# Patient Record
(demographics unavailable — no encounter records)

---

## 2025-02-22 NOTE — HISTORY OF PRESENT ILLNESS
[FreeTextEntry1] :    38yo presents for annual exam. LMP 25 weeks ago, regular cycles since her last child No complaints, declines vaginal pain, bleeding, discharge, odor, or dysuria. Pt has a hx of infertility and irregular periods for 7 years. After her last child 6.5 years ago she has had regular cycles. Pt is not currently on birth control and has not been since her last child. Reviewed bc options and pt wants to try depo-provera shot. Pt diagnosed with enlarged thyroid and is followed by primary care where she checks thyroid functions regularly.    ob hx: [id number]  x 3   largest 7lbs  hx molar pregnancy    gyn: hx of abnormal paps - colposcopy x2.  Pt states that her body "dispelled" 2 ParaGard IUDs. One fell out, one caused extreme pain and had to be removed within 3 days of placement.    pmh: enlarged thyroid / goiter,  migraines    psh: tummy tuck & inguinal hernia repair    meds: Imitrex   fam hx: no pertinent fmh    nka

## 2025-02-22 NOTE — END OF VISIT
[FreeTextEntry3] : as above  annual exam  pap gc chls ent  will rx for depoprovera, gives inj herself

## 2025-02-22 NOTE — HISTORY OF PRESENT ILLNESS
[FreeTextEntry1] :    36yo presents for annual exam. LMP 25 weeks ago, regular cycles since her last child No complaints, declines vaginal pain, bleeding, discharge, odor, or dysuria. Pt has a hx of infertility and irregular periods for 7 years. After her last child 6.5 years ago she has had regular cycles. Pt is not currently on birth control and has not been since her last child. Reviewed bc options and pt wants to try depo-provera shot. Pt diagnosed with enlarged thyroid and is followed by primary care where she checks thyroid functions regularly.    ob hx: [id number]  x 3   largest 7lbs  hx molar pregnancy    gyn: hx of abnormal paps - colposcopy x2.  Pt states that her body "dispelled" 2 ParaGard IUDs. One fell out, one caused extreme pain and had to be removed within 3 days of placement.    pmh: enlarged thyroid / goiter,  migraines    psh: tummy tuck & inguinal hernia repair    meds: Imitrex   fam hx: no pertinent fmh    nka